# Patient Record
Sex: MALE | Race: ASIAN | NOT HISPANIC OR LATINO | URBAN - METROPOLITAN AREA
[De-identification: names, ages, dates, MRNs, and addresses within clinical notes are randomized per-mention and may not be internally consistent; named-entity substitution may affect disease eponyms.]

---

## 2019-03-17 ENCOUNTER — EMERGENCY (EMERGENCY)
Facility: HOSPITAL | Age: 49
LOS: 1 days | Discharge: HOME | End: 2019-03-17
Attending: EMERGENCY MEDICINE | Admitting: EMERGENCY MEDICINE
Payer: MEDICAID

## 2019-03-17 VITALS
SYSTOLIC BLOOD PRESSURE: 144 MMHG | DIASTOLIC BLOOD PRESSURE: 99 MMHG | RESPIRATION RATE: 18 BRPM | OXYGEN SATURATION: 100 % | HEART RATE: 71 BPM

## 2019-03-17 VITALS
HEART RATE: 66 BPM | TEMPERATURE: 99 F | DIASTOLIC BLOOD PRESSURE: 129 MMHG | RESPIRATION RATE: 18 BRPM | SYSTOLIC BLOOD PRESSURE: 195 MMHG | OXYGEN SATURATION: 100 %

## 2019-03-17 LAB
ALBUMIN SERPL ELPH-MCNC: 4.9 G/DL — SIGNIFICANT CHANGE UP (ref 3.3–5)
ALP SERPL-CCNC: 63 U/L — SIGNIFICANT CHANGE UP (ref 40–120)
ALT FLD-CCNC: 23 U/L — SIGNIFICANT CHANGE UP (ref 4–41)
ANION GAP SERPL CALC-SCNC: 10 MMO/L — SIGNIFICANT CHANGE UP (ref 7–14)
AST SERPL-CCNC: 20 U/L — SIGNIFICANT CHANGE UP (ref 4–40)
BASOPHILS # BLD AUTO: 0.02 K/UL — SIGNIFICANT CHANGE UP (ref 0–0.2)
BASOPHILS NFR BLD AUTO: 0.3 % — SIGNIFICANT CHANGE UP (ref 0–2)
BILIRUB SERPL-MCNC: 0.4 MG/DL — SIGNIFICANT CHANGE UP (ref 0.2–1.2)
BUN SERPL-MCNC: 13 MG/DL — SIGNIFICANT CHANGE UP (ref 7–23)
CALCIUM SERPL-MCNC: 9.6 MG/DL — SIGNIFICANT CHANGE UP (ref 8.4–10.5)
CHLORIDE SERPL-SCNC: 99 MMOL/L — SIGNIFICANT CHANGE UP (ref 98–107)
CO2 SERPL-SCNC: 29 MMOL/L — SIGNIFICANT CHANGE UP (ref 22–31)
CREAT SERPL-MCNC: 1.07 MG/DL — SIGNIFICANT CHANGE UP (ref 0.5–1.3)
EOSINOPHIL # BLD AUTO: 0.06 K/UL — SIGNIFICANT CHANGE UP (ref 0–0.5)
EOSINOPHIL NFR BLD AUTO: 0.8 % — SIGNIFICANT CHANGE UP (ref 0–6)
GLUCOSE SERPL-MCNC: 97 MG/DL — SIGNIFICANT CHANGE UP (ref 70–99)
HCT VFR BLD CALC: 43 % — SIGNIFICANT CHANGE UP (ref 39–50)
HGB BLD-MCNC: 14.4 G/DL — SIGNIFICANT CHANGE UP (ref 13–17)
IMM GRANULOCYTES NFR BLD AUTO: 0.4 % — SIGNIFICANT CHANGE UP (ref 0–1.5)
LYMPHOCYTES # BLD AUTO: 1.49 K/UL — SIGNIFICANT CHANGE UP (ref 1–3.3)
LYMPHOCYTES # BLD AUTO: 19.8 % — SIGNIFICANT CHANGE UP (ref 13–44)
MCHC RBC-ENTMCNC: 27 PG — SIGNIFICANT CHANGE UP (ref 27–34)
MCHC RBC-ENTMCNC: 33.5 % — SIGNIFICANT CHANGE UP (ref 32–36)
MCV RBC AUTO: 80.7 FL — SIGNIFICANT CHANGE UP (ref 80–100)
MONOCYTES # BLD AUTO: 0.65 K/UL — SIGNIFICANT CHANGE UP (ref 0–0.9)
MONOCYTES NFR BLD AUTO: 8.7 % — SIGNIFICANT CHANGE UP (ref 2–14)
NEUTROPHILS # BLD AUTO: 5.26 K/UL — SIGNIFICANT CHANGE UP (ref 1.8–7.4)
NEUTROPHILS NFR BLD AUTO: 70 % — SIGNIFICANT CHANGE UP (ref 43–77)
NRBC # FLD: 0 K/UL — LOW (ref 25–125)
PLATELET # BLD AUTO: 179 K/UL — SIGNIFICANT CHANGE UP (ref 150–400)
PMV BLD: 10.5 FL — SIGNIFICANT CHANGE UP (ref 7–13)
POTASSIUM SERPL-MCNC: 3.8 MMOL/L — SIGNIFICANT CHANGE UP (ref 3.5–5.3)
POTASSIUM SERPL-SCNC: 3.8 MMOL/L — SIGNIFICANT CHANGE UP (ref 3.5–5.3)
PROT SERPL-MCNC: 7.5 G/DL — SIGNIFICANT CHANGE UP (ref 6–8.3)
RBC # BLD: 5.33 M/UL — SIGNIFICANT CHANGE UP (ref 4.2–5.8)
RBC # FLD: 12.7 % — SIGNIFICANT CHANGE UP (ref 10.3–14.5)
SODIUM SERPL-SCNC: 138 MMOL/L — SIGNIFICANT CHANGE UP (ref 135–145)
TROPONIN T, HIGH SENSITIVITY: < 6 NG/L — SIGNIFICANT CHANGE UP (ref ?–14)
TROPONIN T, HIGH SENSITIVITY: < 6 NG/L — SIGNIFICANT CHANGE UP (ref ?–14)
WBC # BLD: 7.51 K/UL — SIGNIFICANT CHANGE UP (ref 3.8–10.5)
WBC # FLD AUTO: 7.51 K/UL — SIGNIFICANT CHANGE UP (ref 3.8–10.5)

## 2019-03-17 PROCEDURE — 71046 X-RAY EXAM CHEST 2 VIEWS: CPT | Mod: 26

## 2019-03-17 PROCEDURE — 93010 ELECTROCARDIOGRAM REPORT: CPT

## 2019-03-17 PROCEDURE — 99284 EMERGENCY DEPT VISIT MOD MDM: CPT | Mod: 25

## 2019-03-17 RX ORDER — ASPIRIN/CALCIUM CARB/MAGNESIUM 324 MG
81 TABLET ORAL ONCE
Qty: 0 | Refills: 0 | Status: COMPLETED | OUTPATIENT
Start: 2019-03-17 | End: 2019-03-17

## 2019-03-17 RX ORDER — FAMOTIDINE 10 MG/ML
20 INJECTION INTRAVENOUS ONCE
Qty: 0 | Refills: 0 | Status: COMPLETED | OUTPATIENT
Start: 2019-03-17 | End: 2019-03-17

## 2019-03-17 RX ADMIN — Medication 81 MILLIGRAM(S): at 02:18

## 2019-03-17 RX ADMIN — FAMOTIDINE 20 MILLIGRAM(S): 10 INJECTION INTRAVENOUS at 02:39

## 2019-03-17 NOTE — ED PROVIDER NOTE - PROGRESS NOTE DETAILS
Bonita Singletary MD PGY-1  Pain improved, would like to eat. trop neg x 2. chest xray without acute findings. will d/c with cardiology f/u. Provided with food before discharge

## 2019-03-17 NOTE — ED PROVIDER NOTE - NSFOLLOWUPINSTRUCTIONS_ED_ALL_ED_FT
1. You were seen in the Emergency Room for chest discomfort. The blood tests for the heart were normal  2. You may take Pepcid and Maalox over the counter as instructed  3. Please follow up with your doctor in 24-48 hours. Referral to a primary doctor and cardiologist was provided to you.   4. Return to the emergency room or seek immediate assistance for any new or concerning symptoms (such as fevers, chills,  worsening chest pain, shortness of breath, new weakness or sensation changes ), or if you get worse.   5. Copies of your tests were provided to you for follow-up.  You must address all your findings with your doctor.

## 2019-03-17 NOTE — ED PROVIDER NOTE - NSFOLLOWUPCLINICS_GEN_ALL_ED_FT
[facility]  Cardiology  [address] [place] [id number]  Phone: [phone]  Fax:   Follow Up Time: 1-3 Days    [facility]  General Internal Medicine  [address] [place] [id number]  Phone: [phone]  Fax:   Follow Up Time: 1-3 Days

## 2019-03-17 NOTE — ED ADULT NURSE NOTE - NSIMPLEMENTINTERV_GEN_ALL_ED
Implemented All Universal Safety Interventions:  [place] to call system. Call bell, personal items and telephone within reach. Instruct patient to call for assistance. Room bathroom lighting operational. Non-slip footwear when patient is off stretcher. Physically safe environment: no spills, clutter or unnecessary equipment. Stretcher in lowest position, wheels locked, appropriate side rails in place.

## 2019-03-17 NOTE — ED PROVIDER NOTE - NS ED ROS FT
REVIEW OF SYSTEMS:  General:  no fever, no chills  HEENT: no headache, no vision changes,    Cardiac: + chest pain per HPI, no palpitations currently, but sometimes happebs  Respiratory: no cough, no shortness of breath  Gastrointestinal: no abdominal pain, no nausea, no vomiting, no diarrhea, no melena, no hematochezia   Genitourinary: no dysuria  Extremities: no extremity  Neuro: no focal weakness, no numbness/tingling of the extremities, no decreased sensation  Endo: no known diabetes  Skin:  no rashes  -Bonita Singletary PGY-1

## 2019-03-17 NOTE — ED ADULT NURSE NOTE - CHIEF COMPLAINT QUOTE
chest pain    intermittent left sided "shocking" pain for 1 day.... constant for last 1 1/2 hrs.. feeling fatigued, dizziness.  hx of htn & depression. pt speaks limited english...mostly Tajik. pt's cousin is interpreting but will not be staying for entire hospital stay.

## 2019-03-17 NOTE — ED PROVIDER NOTE - CARE PLAN
Principal Discharge DX:	Chest pain, unspecified type Principal Discharge DX:	Chest pain, unspecified type  Assessment and plan of treatment:	The patient was discharged from the ED in stable condition. All results of today's workup were discussed with the patient and all questions/concerns were addressed. All discharge instructions were thoroughly verbally discussed with the patient, as well as important warning signs and new/ worsening symptoms which should necessitate patient's immediate return to the ED. The patient is agreeable with discharge and expresses full understanding of all instructions given.

## 2019-03-17 NOTE — ED PROVIDER NOTE - ATTENDING CONTRIBUTION TO CARE
HPI: 50 yo M with GERD, HTN, depression, anxiety p/w L sided chest pain started today at 6 hours prior to arrival while at work ([employer]) making a sandwich, dull pain 5/6 with radiation to L shoudler and pain. better when lying down. feels like prior gastric pain which he takes GERD meds for. He denies URI/UTI symptoms, no SOB, no trauma, no rash, no travel, no headache, no jaw pain, no diaphoresis.   EXAM: NAD, heart RRR, no M/R/G/jVD, pulses palpable throughout, lungs ctab, skin without lesions, abd soft nontender, moving all 4 ext.   MDM: concern low for ACS but will work up for such, mostly concern for GERD as pt has history of this and reports symptoms similar to this in past. No famhx of ACS or cardiac dx reported. unlikely PE vs AAA vs dissection as pt has no signs that account for this at this time. No abnormal pulses and no neurological symptoms reported. Will reassess.

## 2019-03-17 NOTE — ED PROVIDER NOTE - OBJECTIVE STATEMENT
Bonita Singletary MD PGY-1 Pt is a 50 yo M with GERD, HTN, depression, anxiety p/w L sided chest pain started today at 6-7pm while at work ([employer]) making a sandwhich, dull pain 5/6 with radiation to L back. better when lying down. feels like prior gastric pain    hx obtained w  ID [id number] pt is Kazakh speaking primarily Bonita Singletary MD PGY-1 Pt is a 50 yo M with GERD, HTN, depression, anxiety p/w L sided chest pain started today at 6-7pm while at work ([employer]) making a sandwich, dull pain 5/6 with radiation to L back. better when lying down. feels like prior gastric pain    hx obtained w  ID [id number] pt is Korean speaking primarily

## 2019-03-17 NOTE — ED PROVIDER NOTE - PHYSICAL EXAMINATION
PHYSICAL EXAM:   General: well-appearing, appears stated age, in no acute distress  HEENT: NC/AT, EOMI  Cardiovascular: regular rate and rhythm, + S1/S2, no murmurs, rubs, gallops appreciated. no chest wall ttp  Respiratory: clear to auscultation bilaterally, good aeration bilaterally, nonlabored respirations  Abdominal: soft, nontender, nondistended, no rebound, guarding or rigidity, +bowel sounds  Extremities: no LE edema b/l.   Neuro: Alert and oriented x3. Nonfocal neurologic exam  Psychiatric: appropriate mood and affect.   Skin: appropriate color, warm, dry.   -Bonita Singletary PGY-1

## 2019-03-17 NOTE — ED PROVIDER NOTE - CLINICAL SUMMARY MEDICAL DECISION MAKING FREE TEXT BOX
Bonita Singletary MD PGY-1 Pt is a 50 yo M with GERD, HTN, depression, anxiety p/w L sided chest pain started today at 6-7pm while at work ([employer]) making a sandwich, dull pain 5/6 with radiation to L back. better when lying down. feels like prior gastric pain. ACS vs unstable angina vs GERD vs MSK. No cough/sob, so less likely PNA/pleurisy. Labs, chest xray, symptomatic treatment, ASA, reassess

## 2019-03-17 NOTE — ED ADULT NURSE NOTE - OBJECTIVE STATEMENT
Pt received in rm 22, 49Y male, Aox4, ambulatory , primarily German speaking. Pt c/o chest pain starting today while at work around 5PM, associated with weakness. Pt reports left sided chest pain radiating to left shoulder. Pmhx HTN, depression. Pt denies any sob, N/V/D, fevers, chills, dizziness. IV access obtained 20G Lft AC, labs sent as ordered, pt vitals as charted, NSR on cardiac monitor, MD at bedside for eval, will continue to monitor.

## 2019-03-17 NOTE — ED ADULT TRIAGE NOTE - CHIEF COMPLAINT QUOTE
chest pain    intermittent left sided "shocking" pain for 1 day.... constant for last 1 1/2 hrs.. feeling fatigued, dizziness.  hx of htn & depression. pt speaks limited english...mostly Faroese. pt's cousin is interpreting but will not be staying for entire hospital stay.

## 2023-02-24 NOTE — ED ADULT NURSE NOTE - NSSISCREENINGQ1_ED_A_ED
Thank you for letting me care for you today! It was nice meeting you, and I hope you feel better soon. Please come back to [facility] for all of your future medical needs.   Our goal in the ER is to always give you outstanding care and exceptional service. You may receive a survey by mail or email in the next week about your experience in our ED. We would greatly appreciate you completing and returning the survey. Your feedback provides us with a way to recognize our staff who give very good care and it helps us learn how to improve when your experience was below our aspiration of excellence.     Sincerely,     Elaine Yusuf PA-C  Emergency Room Physician Assistant  [facility]     
No

## 2024-01-29 ENCOUNTER — EMERGENCY (EMERGENCY)
Facility: HOSPITAL | Age: 54
LOS: 1 days | Discharge: HOME | End: 2024-01-29
Attending: EMERGENCY MEDICINE | Admitting: EMERGENCY MEDICINE
Payer: MEDICAID

## 2024-01-29 VITALS
HEART RATE: 90 BPM | SYSTOLIC BLOOD PRESSURE: 146 MMHG | TEMPERATURE: 98 F | OXYGEN SATURATION: 100 % | RESPIRATION RATE: 15 BRPM | DIASTOLIC BLOOD PRESSURE: 84 MMHG

## 2024-01-29 PROCEDURE — 99285 EMERGENCY DEPT VISIT HI MDM: CPT

## 2024-01-29 PROCEDURE — 93010 ELECTROCARDIOGRAM REPORT: CPT

## 2024-01-29 NOTE — ED ADULT TRIAGE NOTE - CHIEF COMPLAINT QUOTE
pt c/o non radiating midsternal chest pain worse with inhalation x few hours. Hx. HTN. pt denies sob, palpations, n/v. pt well appearing. EKG in progress.

## 2024-01-30 VITALS
HEART RATE: 86 BPM | TEMPERATURE: 99 F | OXYGEN SATURATION: 95 % | SYSTOLIC BLOOD PRESSURE: 155 MMHG | DIASTOLIC BLOOD PRESSURE: 86 MMHG | RESPIRATION RATE: 16 BRPM

## 2024-01-30 PROBLEM — I10 ESSENTIAL (PRIMARY) HYPERTENSION: Chronic | Status: ACTIVE | Noted: 2019-03-17

## 2024-01-30 PROBLEM — K21.9 GASTRO-ESOPHAGEAL REFLUX DISEASE WITHOUT ESOPHAGITIS: Chronic | Status: ACTIVE | Noted: 2019-03-17

## 2024-01-30 LAB
ALBUMIN SERPL ELPH-MCNC: 4.5 G/DL — SIGNIFICANT CHANGE UP (ref 3.3–5)
ALP SERPL-CCNC: 64 U/L — SIGNIFICANT CHANGE UP (ref 40–120)
ALT FLD-CCNC: 21 U/L — SIGNIFICANT CHANGE UP (ref 4–41)
ANION GAP SERPL CALC-SCNC: 13 MMOL/L — SIGNIFICANT CHANGE UP (ref 7–14)
AST SERPL-CCNC: 24 U/L — SIGNIFICANT CHANGE UP (ref 4–40)
BASOPHILS # BLD AUTO: 0.01 K/UL — SIGNIFICANT CHANGE UP (ref 0–0.2)
BASOPHILS NFR BLD AUTO: 0.1 % — SIGNIFICANT CHANGE UP (ref 0–2)
BILIRUB SERPL-MCNC: 1 MG/DL — SIGNIFICANT CHANGE UP (ref 0.2–1.2)
BUN SERPL-MCNC: 8 MG/DL — SIGNIFICANT CHANGE UP (ref 7–23)
CALCIUM SERPL-MCNC: 9.2 MG/DL — SIGNIFICANT CHANGE UP (ref 8.4–10.5)
CHLORIDE SERPL-SCNC: 99 MMOL/L — SIGNIFICANT CHANGE UP (ref 98–107)
CK MB BLD-MCNC: 1.7 % — SIGNIFICANT CHANGE UP (ref 0–2.5)
CK MB CFR SERPL CALC: 1.6 NG/ML — SIGNIFICANT CHANGE UP
CK SERPL-CCNC: 92 U/L — SIGNIFICANT CHANGE UP (ref 30–200)
CO2 SERPL-SCNC: 26 MMOL/L — SIGNIFICANT CHANGE UP (ref 22–31)
CREAT SERPL-MCNC: 0.97 MG/DL — SIGNIFICANT CHANGE UP (ref 0.5–1.3)
EGFR: 94 ML/MIN/1.73M2 — SIGNIFICANT CHANGE UP
EOSINOPHIL # BLD AUTO: 0.01 K/UL — SIGNIFICANT CHANGE UP (ref 0–0.5)
EOSINOPHIL NFR BLD AUTO: 0.1 % — SIGNIFICANT CHANGE UP (ref 0–6)
GLUCOSE SERPL-MCNC: 103 MG/DL — HIGH (ref 70–99)
HCT VFR BLD CALC: 39.8 % — SIGNIFICANT CHANGE UP (ref 39–50)
HGB BLD-MCNC: 13.5 G/DL — SIGNIFICANT CHANGE UP (ref 13–17)
IANC: 10.93 K/UL — HIGH (ref 1.8–7.4)
IMM GRANULOCYTES NFR BLD AUTO: 0.9 % — SIGNIFICANT CHANGE UP (ref 0–0.9)
LIDOCAIN IGE QN: 36 U/L — SIGNIFICANT CHANGE UP (ref 7–60)
LYMPHOCYTES # BLD AUTO: 0.87 K/UL — LOW (ref 1–3.3)
LYMPHOCYTES # BLD AUTO: 6.6 % — LOW (ref 13–44)
MCHC RBC-ENTMCNC: 27.3 PG — SIGNIFICANT CHANGE UP (ref 27–34)
MCHC RBC-ENTMCNC: 33.9 GM/DL — SIGNIFICANT CHANGE UP (ref 32–36)
MCV RBC AUTO: 80.4 FL — SIGNIFICANT CHANGE UP (ref 80–100)
MONOCYTES # BLD AUTO: 1.25 K/UL — HIGH (ref 0–0.9)
MONOCYTES NFR BLD AUTO: 9.5 % — SIGNIFICANT CHANGE UP (ref 2–14)
NEUTROPHILS # BLD AUTO: 10.93 K/UL — HIGH (ref 1.8–7.4)
NEUTROPHILS NFR BLD AUTO: 82.8 % — HIGH (ref 43–77)
NRBC # BLD: 0 /100 WBCS — SIGNIFICANT CHANGE UP (ref 0–0)
NRBC # FLD: 0 K/UL — SIGNIFICANT CHANGE UP (ref 0–0)
PLATELET # BLD AUTO: 145 K/UL — LOW (ref 150–400)
POTASSIUM SERPL-MCNC: 3.8 MMOL/L — SIGNIFICANT CHANGE UP (ref 3.5–5.3)
POTASSIUM SERPL-SCNC: 3.8 MMOL/L — SIGNIFICANT CHANGE UP (ref 3.5–5.3)
PROT SERPL-MCNC: 6.8 G/DL — SIGNIFICANT CHANGE UP (ref 6–8.3)
RBC # BLD: 4.95 M/UL — SIGNIFICANT CHANGE UP (ref 4.2–5.8)
RBC # FLD: 12.8 % — SIGNIFICANT CHANGE UP (ref 10.3–14.5)
SODIUM SERPL-SCNC: 138 MMOL/L — SIGNIFICANT CHANGE UP (ref 135–145)
TROPONIN T, HIGH SENSITIVITY RESULT: 7 NG/L — SIGNIFICANT CHANGE UP
TROPONIN T, HIGH SENSITIVITY RESULT: 8 NG/L — SIGNIFICANT CHANGE UP
WBC # BLD: 13.19 K/UL — HIGH (ref 3.8–10.5)
WBC # FLD AUTO: 13.19 K/UL — HIGH (ref 3.8–10.5)

## 2024-01-30 PROCEDURE — 71046 X-RAY EXAM CHEST 2 VIEWS: CPT | Mod: 26

## 2024-01-30 RX ORDER — ASPIRIN/CALCIUM CARB/MAGNESIUM 324 MG
162 TABLET ORAL ONCE
Refills: 0 | Status: COMPLETED | OUTPATIENT
Start: 2024-01-30 | End: 2024-01-30

## 2024-01-30 RX ORDER — FAMOTIDINE 10 MG/ML
20 INJECTION INTRAVENOUS ONCE
Refills: 0 | Status: COMPLETED | OUTPATIENT
Start: 2024-01-30 | End: 2024-01-30

## 2024-01-30 RX ORDER — ONDANSETRON 8 MG/1
4 TABLET, FILM COATED ORAL ONCE
Refills: 0 | Status: COMPLETED | OUTPATIENT
Start: 2024-01-30 | End: 2024-01-30

## 2024-01-30 RX ADMIN — Medication 162 MILLIGRAM(S): at 01:56

## 2024-01-30 RX ADMIN — ONDANSETRON 4 MILLIGRAM(S): 8 TABLET, FILM COATED ORAL at 01:57

## 2024-01-30 RX ADMIN — Medication 30 MILLILITER(S): at 01:57

## 2024-01-30 RX ADMIN — FAMOTIDINE 20 MILLIGRAM(S): 10 INJECTION INTRAVENOUS at 01:57

## 2024-01-30 NOTE — ED PROVIDER NOTE - PROGRESS NOTE DETAILS
JAVON: Patient was seen and reevaluated and his symptoms are improved from arrival.  His laboratory results are within normal limits.  Troponin is negative x 2 chemistry is unrevealing and his chest x-ray was read cleared by radiologist and reviewed by me personally.  EKG is nonischemic at this time.  Patient will be discharged home to follow-up with his PMD and cardiologist as an outpatient.  Return precautions explained and understood.

## 2024-01-30 NOTE — ED PROVIDER NOTE - OBJECTIVE STATEMENT
54-year-old male with a history of hypertension, GERD, anxiety and depression that presents with chest pain.  Patient states that he was at work and started having right-sided chest pain nonradiating.  Denies any nausea, vomiting, diarrhea recently.  Denies any fevers or chills recently denies any headache or vision changes.  No paresthesias.  Patient denies any increased belching or gas.  Has not take any medications for his condition.  Patient was here per my chart review in 2019 for similar symptoms and states that he followed up with cardiologist since and states that they said there was nothing wrong.  Patient is a non-smoker no drinking no drugs.  No trauma no travel no immobility.  No history of coagulopathy.  Patient speaks Urdu but has since nephew at bedside is translating which is preferable to the patient

## 2024-01-30 NOTE — ED PROVIDER NOTE - PHYSICAL EXAMINATION
Well-appearing but anxious.  Heart is regular rate and rhythm without murmurs rubs or gallops.  Lungs are clear to auscultation bilaterally.  Abdomen is soft and nontender without any rebound or guarding.  Negative McBurney's and negative Corrales's.  No CVA tenderness.  Skin appears normal.  No pitting edema bilateral lower extremities.

## 2024-01-30 NOTE — ED PROVIDER NOTE - NSFOLLOWUPINSTRUCTIONS_ED_ALL_ED_FT
You were seen in the emergency department for chest pain.      We obtained laboratory results including things to check your heart and your lungs and we did not find any abnormalities.    Please take these results to your primary care doctor and your cardiologist to follow-up to see if there is anything else that they need to do for your condition.    If your symptoms worsen you can come back to the emergency department at any time for further evaluation.

## 2024-01-30 NOTE — ED ADULT NURSE NOTE - OBJECTIVE STATEMENT
54 y/o M presents to ED intake A&Ox4 c/o chest pain since 1900. pt endorsing non radiating epigastric pain with associated nausea. denies SOB, weakness, fevers, chills, abd pain, HA. no acute distress noted. PMHx HTN. respirations even and unlabored. 20G to LAC, labs drawn and sent. medicated as per MD orders. awaiting CXRr. safety maintained, son at bedside translating

## 2024-01-30 NOTE — ED PROVIDER NOTE - CLINICAL SUMMARY MEDICAL DECISION MAKING FREE TEXT BOX
54-year-old male with history of hypertension, GERD, anxiety and depression that presents with chest pain.  Patient EKG taken in triage shows no ischemic changes at this time.  Patient vital signs are noted to be minimally hypertensive but otherwise consistent with his previous issues of hypertension.  His workup in 2019 per my chart review shows he had 2 negative tropes and a normal x-ray.  EKG at that same time is similar in appearance to his current EKG.  At this time will rule out ACS versus pneumonia versus GERD by giving GI cocktail obtaining troponin x-ray and basic labs and reassess.  Patient has a cardiologist he can follow-up with outpatient.

## 2024-01-30 NOTE — ED ADULT NURSE NOTE - NSFALLUNIVINTERV_ED_ALL_ED
Bed/Stretcher in lowest position, wheels locked, appropriate side rails in place/Call bell, personal items and telephone in reach/Instruct patient to call for assistance before getting out of bed/chair/stretcher/Non-slip footwear applied when patient is off stretcher/[place] to call system/Physically safe environment - no spills, clutter or unnecessary equipment/Purposeful proactive rounding/Room/bathroom lighting operational, light cord in reach
